# Patient Record
Sex: MALE | Race: WHITE | Employment: FULL TIME | URBAN - METROPOLITAN AREA
[De-identification: names, ages, dates, MRNs, and addresses within clinical notes are randomized per-mention and may not be internally consistent; named-entity substitution may affect disease eponyms.]

---

## 2024-09-24 PROBLEM — R73.03 PREDIABETES: Status: ACTIVE | Noted: 2020-05-19

## 2024-09-24 PROBLEM — K57.30 DIVERTICULOSIS OF SIGMOID COLON: Status: ACTIVE | Noted: 2024-09-24

## 2024-09-24 PROBLEM — E78.00 PURE HYPERCHOLESTEROLEMIA: Status: ACTIVE | Noted: 2024-09-24

## 2024-09-24 PROBLEM — R06.81 APNEA: Status: ACTIVE | Noted: 2024-09-24

## 2024-09-24 PROBLEM — K63.5 POLYP OF SIGMOID COLON: Status: RESOLVED | Noted: 2024-09-24 | Resolved: 2024-09-24

## 2024-09-24 PROBLEM — R73.9 HYPERGLYCEMIA: Status: ACTIVE | Noted: 2024-09-24

## 2024-09-25 ENCOUNTER — INITIAL CONSULT (OUTPATIENT)
Dept: SURGERY | Age: 59
End: 2024-09-25
Payer: COMMERCIAL

## 2024-09-25 VITALS
WEIGHT: 315 LBS | SYSTOLIC BLOOD PRESSURE: 134 MMHG | BODY MASS INDEX: 38.36 KG/M2 | DIASTOLIC BLOOD PRESSURE: 76 MMHG | HEIGHT: 76 IN

## 2024-09-25 DIAGNOSIS — L72.3 SEBACEOUS CYST: Primary | ICD-10-CM

## 2024-09-25 PROCEDURE — G8417 CALC BMI ABV UP PARAM F/U: HCPCS | Performed by: SURGERY

## 2024-09-25 PROCEDURE — G8427 DOCREV CUR MEDS BY ELIG CLIN: HCPCS | Performed by: SURGERY

## 2024-09-25 PROCEDURE — 1036F TOBACCO NON-USER: CPT | Performed by: SURGERY

## 2024-09-25 PROCEDURE — 3017F COLORECTAL CA SCREEN DOC REV: CPT | Performed by: SURGERY

## 2024-09-25 PROCEDURE — 99202 OFFICE O/P NEW SF 15 MIN: CPT | Performed by: SURGERY

## 2024-09-25 RX ORDER — LEVOTHYROXINE SODIUM 50 UG/1
TABLET ORAL
COMMUNITY
Start: 2024-07-03

## 2024-09-25 RX ORDER — TIRZEPATIDE 7.5 MG/.5ML
INJECTION, SOLUTION SUBCUTANEOUS
COMMUNITY
Start: 2024-09-11

## 2024-09-25 RX ORDER — LOSARTAN POTASSIUM 100 MG/1
TABLET ORAL
COMMUNITY
Start: 2024-08-02

## 2024-09-25 RX ORDER — ROSUVASTATIN CALCIUM 20 MG/1
TABLET, COATED ORAL
COMMUNITY
Start: 2024-08-02

## 2025-04-24 ENCOUNTER — TELEPHONE (OUTPATIENT)
Dept: SURGERY | Age: 60
End: 2025-04-24

## 2025-04-24 NOTE — TELEPHONE ENCOUNTER
Patient saw you 9/25/2024 for consult of cyst on his back. He is wanting to go ahead and have it removed. Is it ok to schedule his surgery, or do you need to see him again to evaluate the area?

## 2025-06-30 ENCOUNTER — PREP FOR PROCEDURE (OUTPATIENT)
Dept: SURGERY | Age: 60
End: 2025-06-30

## 2025-06-30 ENCOUNTER — TELEPHONE (OUTPATIENT)
Dept: SURGERY | Age: 60
End: 2025-06-30

## 2025-06-30 DIAGNOSIS — L72.3 SEBACEOUS CYST: ICD-10-CM

## 2025-06-30 NOTE — TELEPHONE ENCOUNTER
Patient was to call our office back when he was ready to schedule. I spoke with patient and he scheduled for 7/15/2025.

## 2025-06-30 NOTE — TELEPHONE ENCOUNTER
Pt called stating he wanted to have a lesion removed from his back.  States he has already been seen by Dr Abbott for this and is ready to schedule.  He stated he called to schedule and has not heard back regarding this.

## 2025-07-02 NOTE — PROGRESS NOTES
PRE OP INSTRUCTION SHEET                                        3. Aspirin, Ibuprofen, Advil, Naproxen, Vitamin E, fish oil and other Anti-inflammatory products should be stopped for 5 days before surgery or as directed by your physician.   4. Check with your Doctor regarding stopping Plavix, Coumadin, Lovenox, Fragmin or other blood thinners   5. Do not smoke, and do not drink any alcoholic beverages 24 hours prior to surgery.  This includes NA Beer.   6. You may brush your teeth and gargle the morning of surgery.  DO NOT SWALLOW WATER   7. You MUST make arrangements for a responsible adult to take you home after your surgery. You will not be allowed to leave alone or drive yourself home.  It is strongly suggested someone stay with you the first 24 hrs. Your surgery will be cancelled if you do not have a ride home.   8. A parent/legal guardian must accompany a child scheduled for surgery and plan to stay at the hospital until the child is discharged.  Please do not bring other children with you.   9. Please wear simple, loose fitting clothing to the hospital.  Do not bring valuables (money, credit cards, checkbooks, etc.) Do not wear any makeup (including no eye makeup) or nail polish on your fingers or toes.   10. DO NOT wear any jewelry or piercings on day of surgery.  All body piercing jewelry must be removed.   11. If you have dentures,glasses, or contacts they will be removed before going to the OR; we will provide you a container.    12. Please see your family doctor/and cardiologist for a history & physical and/or concerning medications.  Bring any test results/reports from your physician's office. Have history and labs faxed to [phone]    13. Remember to bring Blood Bank bracelet on the day of surgery.   14. If you have a Living Will and Durable Power of  for Healthcare, please bring in a copy.   15. Notify your Surgeon if you develop any illness between now and surgery

## 2025-07-15 ENCOUNTER — HOSPITAL ENCOUNTER (OUTPATIENT)
Age: 60
Setting detail: OUTPATIENT SURGERY
Discharge: HOME | End: 2025-07-15
Attending: SURGERY | Admitting: SURGERY
Payer: COMMERCIAL

## 2025-07-15 VITALS
WEIGHT: 280 LBS | OXYGEN SATURATION: 97 % | RESPIRATION RATE: 16 BRPM | TEMPERATURE: 98 F | SYSTOLIC BLOOD PRESSURE: 122 MMHG | HEART RATE: 65 BPM | HEIGHT: 76 IN | DIASTOLIC BLOOD PRESSURE: 81 MMHG | BODY MASS INDEX: 34.1 KG/M2

## 2025-07-15 DIAGNOSIS — L72.3 SEBACEOUS CYST: ICD-10-CM

## 2025-07-15 PROCEDURE — 6360000002 HC RX W HCPCS: Performed by: SURGERY

## 2025-07-15 PROCEDURE — 3600000002 HC SURGERY LEVEL 2 BASE: Performed by: SURGERY

## 2025-07-15 PROCEDURE — 88304 TISSUE EXAM BY PATHOLOGIST: CPT

## 2025-07-15 PROCEDURE — 11402 EXC TR-EXT B9+MARG 1.1-2 CM: CPT | Performed by: SURGERY

## 2025-07-15 PROCEDURE — 2709999900 HC NON-CHARGEABLE SUPPLY: Performed by: SURGERY

## 2025-07-15 PROCEDURE — 12032 INTMD RPR S/A/T/EXT 2.6-7.5: CPT | Performed by: SURGERY

## 2025-07-15 PROCEDURE — 3600000012 HC SURGERY LEVEL 2 ADDTL 15MIN: Performed by: SURGERY

## 2025-07-15 PROCEDURE — 7100000010 HC PHASE II RECOVERY - FIRST 15 MIN: Performed by: SURGERY

## 2025-07-15 PROCEDURE — 2500000003 HC RX 250 WO HCPCS: Performed by: SURGERY

## 2025-07-15 RX ORDER — MAGNESIUM HYDROXIDE 1200 MG/15ML
LIQUID ORAL CONTINUOUS PRN
Status: COMPLETED | OUTPATIENT
Start: 2025-07-15 | End: 2025-07-15

## 2025-07-15 ASSESSMENT — PAIN - FUNCTIONAL ASSESSMENT: PAIN_FUNCTIONAL_ASSESSMENT: 0-10

## 2025-07-15 NOTE — H&P
Department of General Surgery - Adult  Surgical Service   Attending History and Physical        CHIEF COMPLAINT:  Cyst back      History Obtained From:  patient    HISTORY OF PRESENT ILLNESS:    This patient is a 60 y.o. male who presents with need for cyst excision.    Past Medical History:        Diagnosis Date    Hyperlipidemia     Polyp of sigmoid colon 09/24/2024    Sleep apnea      Past Surgical History:        Procedure Laterality Date    COLONOSCOPY      COLONOSCOPY  2/10/16    polyp, tic    KIDNEY BIOPSY       Current Medications:  No current facility-administered medications for this encounter.     Home Medications:  Prior to Admission medications    Medication Sig Start Date End Date Taking? Authorizing Provider   rosuvastatin (CRESTOR) 20 MG tablet  8/2/24  Yes Cl Sr MD   losartan (COZAAR) 100 MG tablet Take 1 tablet by mouth daily 8/2/24  Yes Cl Sr MD   levothyroxine (SYNTHROID) 50 MCG tablet  7/3/24  Yes Cl Sr MD   Coenzyme Q10 (COQ10) 100 MG CAPS Take 1 capsule by mouth daily   Yes Cl Sr MD   Multiple Vitamin (MULTI-VITAMIN PO) Take 1 capsule by mouth daily   Yes Provider, Historical, MD   MOUNJARO 7.5 MG/0.5ML SOPN SC injection  9/11/24   Cl Sr MD   pravastatin (PRAVACHOL) 40 MG tablet Take 40 mg by mouth daily.  Patient not taking: Reported on 9/25/2024    Cl Sr MD     Allergies:  Patient has no known allergies.      Social History:   TOBACCO:   reports that he has quit smoking. His smoking use included cigarettes. He has been exposed to tobacco smoke. He has never used smokeless tobacco.  ETOH:   reports that he does not currently use alcohol.  Family History:       Problem Relation Age of Onset    Cancer Mother         skin    Cancer Father         lung     REVIEW OF SYSTEMS:    Patient reports no complaints related to eyes, ears, nose , throat or mouth.  No chest pain or SOB.  No urinary complaints or

## 2025-07-15 NOTE — PROGRESS NOTES
Discharge instructions given to patient at this time, he states understanding and denies any questions. Patient walked out on his own in stable condition.

## 2025-07-15 NOTE — BRIEF OP NOTE
Brief Postoperative Note      Patient: David Willis  YOB: 1965  MRN: [id number]    Date of Procedure: 7/15/2025    Pre-Op Diagnosis Codes:      * Sebaceous cyst [L72.3]    Post-Op Diagnosis: Same       Procedure(s):  EXCISION CYST MID BACK    Surgeon(s):  Andrea Abbott MD    Assistant:  Surgical Assistant: Jennie Watson    Anesthesia: Local    Estimated Blood Loss (mL): Minimal    Complications: None    Specimens:   ID Type Source Tests Collected by Time Destination   A : cyst mid back Tissue Tissue SURGICAL PATHOLOGY Andrea Abbott MD 7/15/2025 0904        Implants:  * No implants in log *      Drains: * No LDAs found *    Findings:  Present At Time Of Surgery (PATOS) (choose all levels that have infection present):  No infection present  Other Findings: As above  This procedure was not performed to treat primary cutaneous melanoma through wide local excision    Electronically signed by ANDREA ABBOTT MD on 7/15/2025 at 9:11 AM

## 2025-07-15 NOTE — DISCHARGE INSTRUCTIONS
[facility]    Rc Ferguson M.D.   [facility] Office      [facility]        [facility]               [address]                [address]  Elton Devine M.D.              [address]           [address]          [place] [address]         [place] [address]  Morgan Bryan M.D                         [phone]        [facility]                   Jonathan Abbott M.D.          [facility]       POST-OPERATIVE INSTRUCTIONS FOLLOWING EXCISION OF A CYST    The office will call with biopsy results.  Call the office if you have not heard any results in 1 week.     You may remove your dressing tomorrow.  Leave the steri-stips in place. These will peel away in 7-10 days.     You may shower after you have removed the dressing.  Wash incision gently and pat dry. Do not rub your incision.       Watch for signs of infection:       Fever over 100.5°     Excessive warmth, or bright redness around your incision    Leakage of bloody or cloudy fluid from you incisions

## 2025-07-16 ENCOUNTER — RESULTS FOLLOW-UP (OUTPATIENT)
Dept: SURGERY | Age: 60
End: 2025-07-16

## 2025-07-18 NOTE — TELEPHONE ENCOUNTER
----- Message from Dr. Jonathan Abbott MD sent at 7/16/2025  7:54 PM EDT -----  Tell the patient that the lump removed was a benign cyst.  Follow up prn.

## 2025-07-20 NOTE — OP NOTE
[address] [address]                            OPERATIVE REPORT      PATIENT NAME: UNIQUE SESAY              : 1965  MED REC NO: [id number]                      ROOM: [facility]  ACCOUNT NO: [id number]                       ADMIT DATE: 07/15/2025  PROVIDER: Andrea Barrera MD      DATE OF PROCEDURE:  07/15/2025    SURGEON:  Andrea Barrera MD    OPERATION PERFORMED:  Excision of cystic mass, mid back.    PREOPERATIVE DIAGNOSIS:  Cystic mass, mid back.    POSTOPERATIVE DIAGNOSIS:  Cystic mass, mid back.    ANESTHESIA:  Local.    COMPLICATIONS:  None.    ESTIMATED BLOOD LOSS:  Less than 50 mL.    INDICATION FOR PROCEDURE:  A 60-year-old male with a cystic mass in the mid back.  It was causing acute pain and skin sensation disturbance.  We recommended operative excision.  The risks and benefits were explained.  The patient understood them, accepted them, and elected to proceed.    DESCRIPTION OF OPERATION:  The patient was brought to the operating room.  He was placed in a prone position.  He was prepped and draped in the usual surgical sterile fashion.  Local anesthetic was infiltrated.  An elliptical incision was made around the cystic mass in the back.  The excised diameter was 1.5 cm.  The incision length was 3 cm.  Full-thickness excision was performed.  Hemostasis was obtained.  Intermediate type closure was performed by closing both the superficial fascia and subcutaneous tissue with 3-0 Vicryl suture.  4-0 Vicryl was used to reapproximate the skin.  Benzoin and Steri-Strip bandage were placed.    DISPOSITION:  The patient tolerated the procedure without any acute complications.          ANDREA BARRERA MD      D:  2025 18:01:45     T:  2025 18:35:52     [facility]  Job #:  [id number]     Doc#:  [id number]    CC:   Martin Zhao

## 2025-08-11 ENCOUNTER — TELEPHONE (OUTPATIENT)
Dept: INTERNAL MEDICINE CLINIC | Age: 60
End: 2025-08-11

## (undated) DEVICE — GLOVE ORANGE PI 8 1/2   MSG9085

## (undated) DEVICE — GOWN,AURORA,NONREINF,RAGLAN,XXL,STERILE: Brand: MEDLINE

## (undated) DEVICE — MHCZ MINOR: Brand: MEDLINE INDUSTRIES, INC.

## (undated) DEVICE — TIP SUCT DIA12FR W STYL CTRL VENT DISPOSABLE FRAZ